# Patient Record
Sex: MALE | Race: WHITE | HISPANIC OR LATINO | Employment: FULL TIME | URBAN - METROPOLITAN AREA
[De-identification: names, ages, dates, MRNs, and addresses within clinical notes are randomized per-mention and may not be internally consistent; named-entity substitution may affect disease eponyms.]

---

## 2022-09-24 ENCOUNTER — HOSPITAL ENCOUNTER (EMERGENCY)
Facility: MEDICAL CENTER | Age: 27
End: 2022-09-24
Attending: EMERGENCY MEDICINE

## 2022-09-24 ENCOUNTER — APPOINTMENT (OUTPATIENT)
Dept: RADIOLOGY | Facility: MEDICAL CENTER | Age: 27
End: 2022-09-24
Attending: EMERGENCY MEDICINE

## 2022-09-24 VITALS
HEART RATE: 69 BPM | SYSTOLIC BLOOD PRESSURE: 103 MMHG | RESPIRATION RATE: 20 BRPM | TEMPERATURE: 97.7 F | OXYGEN SATURATION: 98 % | WEIGHT: 130 LBS | BODY MASS INDEX: 20.89 KG/M2 | DIASTOLIC BLOOD PRESSURE: 64 MMHG | HEIGHT: 66 IN

## 2022-09-24 DIAGNOSIS — F19.10 DRUG ABUSE (HCC): ICD-10-CM

## 2022-09-24 DIAGNOSIS — Z00.8 MEDICAL CLEARANCE FOR INCARCERATION: ICD-10-CM

## 2022-09-24 LAB
ALBUMIN SERPL BCP-MCNC: 4.5 G/DL (ref 3.2–4.9)
ALBUMIN/GLOB SERPL: 1.7 G/DL
ALP SERPL-CCNC: 76 U/L (ref 30–99)
ALT SERPL-CCNC: 18 U/L (ref 2–50)
ANION GAP SERPL CALC-SCNC: 14 MMOL/L (ref 7–16)
AST SERPL-CCNC: 26 U/L (ref 12–45)
BASOPHILS # BLD AUTO: 0.2 % (ref 0–1.8)
BASOPHILS # BLD: 0.02 K/UL (ref 0–0.12)
BILIRUB SERPL-MCNC: 0.5 MG/DL (ref 0.1–1.5)
BUN SERPL-MCNC: 13 MG/DL (ref 8–22)
CALCIUM SERPL-MCNC: 9.3 MG/DL (ref 8.5–10.5)
CHLORIDE SERPL-SCNC: 100 MMOL/L (ref 96–112)
CO2 SERPL-SCNC: 25 MMOL/L (ref 20–33)
CREAT SERPL-MCNC: 0.96 MG/DL (ref 0.5–1.4)
EKG IMPRESSION: NORMAL
EOSINOPHIL # BLD AUTO: 0.02 K/UL (ref 0–0.51)
EOSINOPHIL NFR BLD: 0.2 % (ref 0–6.9)
ERYTHROCYTE [DISTWIDTH] IN BLOOD BY AUTOMATED COUNT: 39 FL (ref 35.9–50)
GFR SERPLBLD CREATININE-BSD FMLA CKD-EPI: 112 ML/MIN/1.73 M 2
GLOBULIN SER CALC-MCNC: 2.6 G/DL (ref 1.9–3.5)
GLUCOSE SERPL-MCNC: 101 MG/DL (ref 65–99)
HCT VFR BLD AUTO: 39.3 % (ref 42–52)
HGB BLD-MCNC: 13.2 G/DL (ref 14–18)
IMM GRANULOCYTES # BLD AUTO: 0.03 K/UL (ref 0–0.11)
IMM GRANULOCYTES NFR BLD AUTO: 0.3 % (ref 0–0.9)
LYMPHOCYTES # BLD AUTO: 1.96 K/UL (ref 1–4.8)
LYMPHOCYTES NFR BLD: 22.1 % (ref 22–41)
MCH RBC QN AUTO: 29.2 PG (ref 27–33)
MCHC RBC AUTO-ENTMCNC: 33.6 G/DL (ref 33.7–35.3)
MCV RBC AUTO: 86.9 FL (ref 81.4–97.8)
MONOCYTES # BLD AUTO: 0.85 K/UL (ref 0–0.85)
MONOCYTES NFR BLD AUTO: 9.6 % (ref 0–13.4)
NEUTROPHILS # BLD AUTO: 5.99 K/UL (ref 1.82–7.42)
NEUTROPHILS NFR BLD: 67.6 % (ref 44–72)
NRBC # BLD AUTO: 0 K/UL
NRBC BLD-RTO: 0 /100 WBC
PLATELET # BLD AUTO: 236 K/UL (ref 164–446)
PMV BLD AUTO: 10.7 FL (ref 9–12.9)
POTASSIUM SERPL-SCNC: 3.5 MMOL/L (ref 3.6–5.5)
PROT SERPL-MCNC: 7.1 G/DL (ref 6–8.2)
RBC # BLD AUTO: 4.52 M/UL (ref 4.7–6.1)
SODIUM SERPL-SCNC: 139 MMOL/L (ref 135–145)
WBC # BLD AUTO: 8.9 K/UL (ref 4.8–10.8)

## 2022-09-24 PROCEDURE — 93005 ELECTROCARDIOGRAM TRACING: CPT | Performed by: EMERGENCY MEDICINE

## 2022-09-24 PROCEDURE — 85025 COMPLETE CBC W/AUTO DIFF WBC: CPT

## 2022-09-24 PROCEDURE — 99284 EMERGENCY DEPT VISIT MOD MDM: CPT

## 2022-09-24 PROCEDURE — 80053 COMPREHEN METABOLIC PANEL: CPT

## 2022-09-24 PROCEDURE — 71045 X-RAY EXAM CHEST 1 VIEW: CPT

## 2022-09-24 PROCEDURE — 36415 COLL VENOUS BLD VENIPUNCTURE: CPT

## 2022-09-24 ASSESSMENT — ENCOUNTER SYMPTOMS
SHORTNESS OF BREATH: 1
DIZZINESS: 1

## 2022-09-25 NOTE — ED PROVIDER NOTES
ED Provider Note    Scribed for Renny Simon M.D. by Dallin Foy. 9/24/2022, 8:04 PM.    Primary care provider: Pcp Pt States None  Means of arrival: police  History obtained from: patient  History limited by: poor historian    CHIEF COMPLAINT  Chief Complaint   Patient presents with    Medical Clearance     Pt sarath RPRITIKA for medical clearance        HPI  Ze Kirk is a 27 y.o. male who presents to the Emergency Department for fentanyl use onset two hours ago. According to police, he was caught [place] fentanyl [place] the [place]. He states that he has unspecified pain and shortness of breath for an unknown reason. He also states that he is feeling dizzy. He states that he has attempted to quit drug use before but has not been able to. He is awaiting medical clearance after which he will be taken to shelter.  Patient is not really cooperative with history.      HPI limited by caveat: erika historian    REVIEW OF SYSTEMS  Review of Systems   Respiratory:  Positive for shortness of breath.    Neurological:  Positive for dizziness.     ROS limited by caveat: poor historian    PAST MEDICAL HISTORY   has a past medical history of ADHD (attention deficit hyperactivity disorder).    SURGICAL HISTORY   has a past surgical history that includes other orthopedic surgery.    SOCIAL HISTORY  Social History     Tobacco Use    Smoking status: Every Day    Smokeless tobacco: Never   Substance Use Topics    Alcohol use: No    Drug use: No      Social History     Substance and Sexual Activity   Drug Use No     FAMILY HISTORY  None noted    CURRENT MEDICATIONS  Current Outpatient Medications   Medication Instructions    hydrocodone-acetaminophen (NORCO) 5-325 MG Tab per tablet 1 Tablet, Oral, EVERY 6 HOURS PRN    penicillin v potassium (VEETID) 500 mg, Oral, EVERY 6 HOURS     ALLERGIES  No Known Allergies    PHYSICAL EXAM  VITAL SIGNS: BP (!) 153/113   Pulse (!) 118   Temp 37.2 °C (98.9 °F) (Temporal)   Resp 20    "Ht 1.676 m (5' 6\")   Wt 59 kg (130 lb)   SpO2 96%   BMI 20.98 kg/m²   Vitals reviewed.  Constitutional: Awake alert anxious in handcuffs, tearful.  No acute cardiopulmonary distress.  HENT: Normocephalic, Atraumatic   Eyes: PERRL, EOMI, Conjunctiva normal,  Neck: Normal range of motion, No tenderness, Supple, No stridor.   Cardiovascular: Normal heart rate, Normal rhythm, No murmurs, No rubs, No gallops.   Thorax & Lungs: Normal breath sounds, No respiratory distress, No wheezing, No chest tenderness.   Abdomen: Bowel sounds normal, Soft, No tenderness  Musculoskeletal: Good range of motion in all major joints.  Neurologic: Alert, No focal deficits noted.   Psychiatric: Anxious and tearful    LABS  Results for orders placed or performed during the hospital encounter of 09/24/22   CBC WITH DIFFERENTIAL   Result Value Ref Range    WBC 8.9 4.8 - 10.8 K/uL    RBC 4.52 (L) 4.70 - 6.10 M/uL    Hemoglobin 13.2 (L) 14.0 - 18.0 g/dL    Hematocrit 39.3 (L) 42.0 - 52.0 %    MCV 86.9 81.4 - 97.8 fL    MCH 29.2 27.0 - 33.0 pg    MCHC 33.6 (L) 33.7 - 35.3 g/dL    RDW 39.0 35.9 - 50.0 fL    Platelet Count 236 164 - 446 K/uL    MPV 10.7 9.0 - 12.9 fL    Neutrophils-Polys 67.60 44.00 - 72.00 %    Lymphocytes 22.10 22.00 - 41.00 %    Monocytes 9.60 0.00 - 13.40 %    Eosinophils 0.20 0.00 - 6.90 %    Basophils 0.20 0.00 - 1.80 %    Immature Granulocytes 0.30 0.00 - 0.90 %    Nucleated RBC 0.00 /100 WBC    Neutrophils (Absolute) 5.99 1.82 - 7.42 K/uL    Lymphs (Absolute) 1.96 1.00 - 4.80 K/uL    Monos (Absolute) 0.85 0.00 - 0.85 K/uL    Eos (Absolute) 0.02 0.00 - 0.51 K/uL    Baso (Absolute) 0.02 0.00 - 0.12 K/uL    Immature Granulocytes (abs) 0.03 0.00 - 0.11 K/uL    NRBC (Absolute) 0.00 K/uL   COMP METABOLIC PANEL   Result Value Ref Range    Sodium 139 135 - 145 mmol/L    Potassium 3.5 (L) 3.6 - 5.5 mmol/L    Chloride 100 96 - 112 mmol/L    Co2 25 20 - 33 mmol/L    Anion Gap 14.0 7.0 - 16.0    Glucose 101 (H) 65 - 99 mg/dL    Bun " 13 8 - 22 mg/dL    Creatinine 0.96 0.50 - 1.40 mg/dL    Calcium 9.3 8.5 - 10.5 mg/dL    AST(SGOT) 26 12 - 45 U/L    ALT(SGPT) 18 2 - 50 U/L    Alkaline Phosphatase 76 30 - 99 U/L    Total Bilirubin 0.5 0.1 - 1.5 mg/dL    Albumin 4.5 3.2 - 4.9 g/dL    Total Protein 7.1 6.0 - 8.2 g/dL    Globulin 2.6 1.9 - 3.5 g/dL    A-G Ratio 1.7 g/dL   ESTIMATED GFR   Result Value Ref Range    GFR (CKD-EPI) 112 >60 mL/min/1.73 m 2   EKG (NOW)   Result Value Ref Range    Report       [facility] Emergency Dept.    Test Date:  2022  Pt Name:    KAT CORBIN                Department: ER  MRN:        [id number]                      Room:       [facility]  Gender:     Male                         Technician: [id number]  :        1995                   Requested By:GENNY WEAVER  Order #:    [id number]                    Reading MD: GENNY WEAVER. AMD    Measurements  Intervals                                Axis  Rate:       84                           P:          87  RI:         163                          QRS:        71  QRSD:       93                           T:          50  QT:         371  QTc:        439    Interpretive Statements  Sinus rhythm  RSR' in V1 or V2, right VCD or RVH  Compared to ECG 2016 23:06:05  Right ventricular hypertrophy now present  RSR' in V1 or V2 now present  Sinus bradycardia no longer present  Electronically Signed On 2022 21:44:20 PDT by GENNY WEAVER. AMD       All labs reviewed by me.    RADIOLOGY  DX-CHEST-PORTABLE (1 VIEW)   Final Result         1. No acute cardiopulmonary abnormalities are identified.        The radiologist's interpretation of all radiological studies have been reviewed by me.    COURSE & MEDICAL DECISION MAKING  Pertinent Labs & Imaging studies reviewed. (See chart for details)    8:04 PM Patient seen and examined at bedside. The patient presents with drug use, and the differential diagnosis includes but is not limited to drug abuse,  complication of fentanyl use, pulmonary edema. Ordered for DX-chest, CBC w/ diff, CMP and EKG to evaluate.     The patient presents for a fentanyl overdose.  he complains of multiple things like a really not determined he does not have a medical problem without some work-up.  He has no apparent complication.  He seems cheerful exam is reassuring his abdominal exam is benign.  He has no signs of trauma on his head he has a clear sensorium.  Think requires a head CT.  Is not on blood thinners.  Neck is nontender.  Lungs are clear.  Chest x-ray there is no signs of pulmonary edema or complications of fentanyl overdose.  EKG is reassuring and labs are also reassuring.  The patient was initially agitated but has defervesced.  He has no other complaints.  He will be medically cleared for [facility].  Vital signs are reassuring and he is discharged in good condition.        FINAL IMPRESSION  1. Medical clearance for incarceration    2. Drug abuse (HCC)          Dallin AVENDAÑO (Scribe), am scribing for, and in the presence of, Renny Simon M.D..    Electronically signed by: Dallin Foy (Scribe), 9/24/2022    Renny AVENDAÑO M.D. personally performed the services described in this documentation, as scribed by Dallin Foy in my presence, and it is both accurate and complete.    The note accurately reflects work and decisions made by me.  Renny Simon M.D.  9/24/2022  9:58 PM

## 2022-09-25 NOTE — ED NOTES
Pt discharged independent and ambulatory into [employer] custody. Discharge instructions given and pt has no follow up questions. Vitals and condition stable for discharge

## 2022-09-25 NOTE — ED NOTES
Assumed care of pt, received report from ANTHONY Mathur. Safety rounds completed, pt sitting at edge of the bed. [employer] at bedside

## 2022-09-25 NOTE — ED TRIAGE NOTES
"Chief Complaint   Patient presents with    Medical Clearance     Pt bib [employer] for medical clearance        Pt presents for medical clearance. Pt was seen smoking fentanyl, then evidently passing out and hitting his head. Bystander called because his children were in the vehicle where he passed out outside of.... Pt denies any pain, no narcan was administered on scene    BP (!) 153/113   Pulse (!) 118   Temp 37.2 °C (98.9 °F) (Temporal)   Resp 20   Ht 1.676 m (5' 6\")   Wt 59 kg (130 lb)   SpO2 96%   BMI 20.98 kg/m²       "

## 2022-09-25 NOTE — DISCHARGE INSTRUCTIONS
Return for any new medical problems or complaints.  Avoid drugs.  You are medically cleared for long term.